# Patient Record
Sex: FEMALE | Race: WHITE | NOT HISPANIC OR LATINO | Employment: OTHER | URBAN - METROPOLITAN AREA
[De-identification: names, ages, dates, MRNs, and addresses within clinical notes are randomized per-mention and may not be internally consistent; named-entity substitution may affect disease eponyms.]

---

## 2023-07-22 ENCOUNTER — HOSPITAL ENCOUNTER (OUTPATIENT)
Dept: DATA CONVERSION | Facility: HOSPITAL | Age: 88
Discharge: STILL ADMITTED OR ADMINISTRATIVE | End: 2023-07-22
Attending: EMERGENCY MEDICINE

## 2023-07-22 DIAGNOSIS — R33.9 RETENTION OF URINE, UNSPECIFIED: ICD-10-CM

## 2023-07-22 DIAGNOSIS — S30.0XXA CONTUSION OF LOWER BACK AND PELVIS, INITIAL ENCOUNTER: ICD-10-CM

## 2023-07-22 DIAGNOSIS — U07.1 COVID-19: ICD-10-CM

## 2023-07-22 DIAGNOSIS — R53.1 WEAKNESS: ICD-10-CM

## 2023-07-22 DIAGNOSIS — E78.5 HYPERLIPIDEMIA, UNSPECIFIED: ICD-10-CM
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
## 2023-07-22 DIAGNOSIS — E87.1 HYPO-OSMOLALITY AND HYPONATREMIA: ICD-10-CM

## 2023-07-22 DIAGNOSIS — M48.061 SPINAL STENOSIS, LUMBAR REGION WITHOUT NEUROGENIC CLAUDICATION: ICD-10-CM
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
## 2023-07-22 DIAGNOSIS — I10 ESSENTIAL (PRIMARY) HYPERTENSION: ICD-10-CM

## 2023-07-22 DIAGNOSIS — Z88.0 ALLERGY STATUS TO PENICILLIN: ICD-10-CM

## 2023-07-22 LAB
ALBUMIN SERPL-MCNC: 4 GM/DL (ref 3.5–5)
ALBUMIN/GLOB SERPL: 1.3 RATIO (ref 1.5–3)
ALP BLD-CCNC: 121 U/L (ref 35–125)
ALT SERPL-CCNC: 9 U/L (ref 5–40)
ANION GAP SERPL CALCULATED.3IONS-SCNC: 13 MMOL/L (ref 0–19)
AST SERPL-CCNC: 19 U/L (ref 5–40)
BASOPHILS # BLD AUTO: 0.04 K/UL (ref 0–0.22)
BASOPHILS NFR BLD AUTO: 0.5 % (ref 0–1)
BILIRUB SERPL-MCNC: 0.5 MG/DL (ref 0.1–1.2)
BUN SERPL-MCNC: 14 MG/DL (ref 8–25)
BUN/CREAT SERPL: 15.6 RATIO (ref 8–21)
CALCIUM SERPL-MCNC: 9.1 MG/DL (ref 8.5–10.4)
CHLORIDE SERPL-SCNC: 97 MMOL/L (ref 97–107)
CO2 SERPL-SCNC: 21 MMOL/L (ref 24–31)
CREAT SERPL-MCNC: 0.9 MG/DL (ref 0.4–1.6)
DEPRECATED RDW RBC AUTO: 39.9 FL (ref 37–54)
DIFFERENTIAL METHOD BLD: ABNORMAL
EOSINOPHIL # BLD AUTO: 0.01 K/UL (ref 0–0.45)
EOSINOPHIL NFR BLD: 0.1 % (ref 0–3)
ERYTHROCYTE [DISTWIDTH] IN BLOOD BY AUTOMATED COUNT: 12.8 % (ref 11.7–15)
GFR SERPL CREATININE-BSD FRML MDRD: 60 ML/MIN/1.73 M2
GLOBULIN SER-MCNC: 3 G/DL (ref 1.9–3.7)
GLUCOSE SERPL-MCNC: 112 MG/DL (ref 65–99)
HCT VFR BLD AUTO: 40.6 % (ref 36–44)
HGB BLD-MCNC: 14 GM/DL (ref 12–15)
HS TROPONIN T DELTA: 1 (ref 0–4)
HS TROPONIN T DELTA: ABNORMAL (ref 0–4)
IMM GRANULOCYTES # BLD AUTO: 0.03 K/UL (ref 0–0.1)
LIPASE SERPL-CCNC: 22 U/L (ref 16–63)
LYMPHOCYTES # BLD AUTO: 0.53 K/UL (ref 1.2–3.2)
LYMPHOCYTES NFR BLD MANUAL: 7.2 % (ref 20–40)
MCH RBC QN AUTO: 29.9 PG (ref 26–34)
MCHC RBC AUTO-ENTMCNC: 34.5 % (ref 31–37)
MCV RBC AUTO: 86.6 FL (ref 80–100)
MONOCYTES # BLD AUTO: 1.13 K/UL (ref 0–0.8)
MONOCYTES NFR BLD MANUAL: 15.3 % (ref 0–8)
NEUTROPHILS # BLD AUTO: 5.63 K/UL
NEUTROPHILS # BLD AUTO: 5.63 K/UL (ref 1.8–7.7)
NEUTROPHILS.IMMATURE NFR BLD: 0.4 % (ref 0–1)
NEUTS SEG NFR BLD: 76.5 % (ref 50–70)
NRBC BLD-RTO: 0 /100 WBC
PLATELET # BLD AUTO: 239 K/UL (ref 150–450)
PMV BLD AUTO: 9.1 CU (ref 7–12.6)
POTASSIUM SERPL-SCNC: 3.7 MMOL/L (ref 3.4–5.1)
PROT SERPL-MCNC: 7 G/DL (ref 5.9–7.9)
RBC # BLD AUTO: 4.69 M/UL (ref 4–4.9)
SODIUM SERPL-SCNC: 131 MMOL/L (ref 133–145)
TROPONIN T SERPL-MCNC: 27 NG/L
TROPONIN T SERPL-MCNC: 28 NG/L
WBC # BLD AUTO: 7.4 K/UL (ref 4.5–11)

## 2023-07-23 LAB
BACTERIA UR QL AUTO: NEGATIVE
BILIRUB UR QL STRIP.AUTO: NEGATIVE
C TRACH RRNA SPEC QL NAA+PROBE: NORMAL
CLARITY UR: CLEAR
COLOR UR: ABNORMAL
DRUG SCREEN COMMENT UR-IMP: NORMAL
GLUCOSE BLD STRIP.AUTO-MCNC: 96 MG/DL (ref 65–99)
GLUCOSE UR STRIP.AUTO-MCNC: NEGATIVE MG/DL
HGB UR QL STRIP.AUTO: 2 /HPF (ref 0–3)
HGB UR QL: NEGATIVE
HYALINE CASTS UR QL AUTO: ABNORMAL /LPF
KETONES UR QL STRIP.AUTO: ABNORMAL
LEUKOCYTE ESTERASE UR QL STRIP.AUTO: NEGATIVE
MICROSCOPIC (UA): ABNORMAL
N GONORRHOEA RRNA SPEC QL NAA+PROBE: NORMAL
NITRITE UR QL STRIP.AUTO: NEGATIVE
NOTE (COV): ABNORMAL
PH UR STRIP.AUTO: 6.5 [PH] (ref 4.6–8)
PROT UR STRIP.AUTO-MCNC: NEGATIVE MG/DL
SARS-COV-2 RNA RESP QL NAA+PROBE: ABNORMAL
SP GR UR STRIP.AUTO: 1.02 (ref 1–1.03)
SPECIMEN SOURCE (COV): ABNORMAL
SPECIMEN SOURCE: NORMAL
SQUAMOUS UR QL AUTO: ABNORMAL /HPF
T VAGINALIS GENITAL QL WET PREP: NEGATIVE
URINE CULTURE: ABNORMAL
UROBILINOGEN UR QL STRIP.AUTO: NORMAL MG/DL (ref 0–1)
WBC #/AREA URNS AUTO: 1 /HPF (ref 0–3)
WBC VAG QL WET PREP: NEGATIVE
YEAST GENITAL QL WET PREP: NEGATIVE

## 2023-07-24 LAB
ANION GAP SERPL CALCULATED.3IONS-SCNC: 11 MMOL/L (ref 0–19)
BUN SERPL-MCNC: 11 MG/DL (ref 8–25)
BUN/CREAT SERPL: 13.8 RATIO (ref 8–21)
CALCIUM SERPL-MCNC: 8.7 MG/DL (ref 8.5–10.4)
CHLORIDE SERPL-SCNC: 97 MMOL/L (ref 97–107)
CK SERPL-CCNC: 103 U/L (ref 24–195)
CO2 SERPL-SCNC: 21 MMOL/L (ref 24–31)
CREAT SERPL-MCNC: 0.8 MG/DL (ref 0.4–1.6)
GFR SERPL CREATININE-BSD FRML MDRD: 69 ML/MIN/1.73 M2
GLUCOSE SERPL-MCNC: 108 MG/DL (ref 65–99)
POTASSIUM SERPL-SCNC: 3.9 MMOL/L (ref 3.4–5.1)
SODIUM SERPL-SCNC: 129 MMOL/L (ref 133–145)

## 2023-07-25 LAB
ANION GAP SERPL CALCULATED.3IONS-SCNC: 11 MMOL/L (ref 0–19)
BUN SERPL-MCNC: 12 MG/DL (ref 8–25)
BUN/CREAT SERPL: 15 RATIO (ref 8–21)
CALCIUM SERPL-MCNC: 8.7 MG/DL (ref 8.5–10.4)
CHLORIDE SERPL-SCNC: 100 MMOL/L (ref 97–107)
CO2 SERPL-SCNC: 22 MMOL/L (ref 24–31)
CREAT SERPL-MCNC: 0.8 MG/DL (ref 0.4–1.6)
GFR SERPL CREATININE-BSD FRML MDRD: 69 ML/MIN/1.73 M2
GLUCOSE SERPL-MCNC: 88 MG/DL (ref 65–99)
POTASSIUM SERPL-SCNC: 3.8 MMOL/L (ref 3.4–5.1)
SODIUM SERPL-SCNC: 133 MMOL/L (ref 133–145)

## 2023-07-26 LAB
ANION GAP SERPL CALCULATED.3IONS-SCNC: 10 MMOL/L (ref 0–19)
BUN SERPL-MCNC: 12 MG/DL (ref 8–25)
BUN/CREAT SERPL: 13.3 RATIO (ref 8–21)
CALCIUM SERPL-MCNC: 8.8 MG/DL (ref 8.5–10.4)
CHLORIDE SERPL-SCNC: 96 MMOL/L (ref 97–107)
CO2 SERPL-SCNC: 26 MMOL/L (ref 24–31)
CREAT SERPL-MCNC: 0.9 MG/DL (ref 0.4–1.6)
GFR SERPL CREATININE-BSD FRML MDRD: 60 ML/MIN/1.73 M2
GLUCOSE SERPL-MCNC: 89 MG/DL (ref 65–99)
POTASSIUM SERPL-SCNC: 3.9 MMOL/L (ref 3.4–5.1)
SODIUM SERPL-SCNC: 132 MMOL/L (ref 133–145)

## 2023-07-27 LAB
ANION GAP SERPL CALCULATED.3IONS-SCNC: 11 MMOL/L (ref 0–19)
BUN SERPL-MCNC: 11 MG/DL (ref 8–25)
BUN/CREAT SERPL: 12.2 RATIO (ref 8–21)
CALCIUM SERPL-MCNC: 8.9 MG/DL (ref 8.5–10.4)
CHLORIDE SERPL-SCNC: 98 MMOL/L (ref 97–107)
CO2 SERPL-SCNC: 24 MMOL/L (ref 24–31)
CREAT SERPL-MCNC: 0.9 MG/DL (ref 0.4–1.6)
DEPRECATED RDW RBC AUTO: 41.4 FL (ref 37–54)
ERYTHROCYTE [DISTWIDTH] IN BLOOD BY AUTOMATED COUNT: 12.9 % (ref 11.7–15)
GFR SERPL CREATININE-BSD FRML MDRD: 60 ML/MIN/1.73 M2
GLUCOSE SERPL-MCNC: 89 MG/DL (ref 65–99)
HCT VFR BLD AUTO: 39.5 % (ref 36–44)
HGB BLD-MCNC: 13.6 GM/DL (ref 12–15)
MCH RBC QN AUTO: 30.4 PG (ref 26–34)
MCHC RBC AUTO-ENTMCNC: 34.4 % (ref 31–37)
MCV RBC AUTO: 88.4 FL (ref 80–100)
NRBC BLD-RTO: 0 /100 WBC
PLATELET # BLD AUTO: 230 K/UL (ref 150–450)
PMV BLD AUTO: 9.6 CU (ref 7–12.6)
POTASSIUM SERPL-SCNC: 3.9 MMOL/L (ref 3.4–5.1)
RBC # BLD AUTO: 4.47 M/UL (ref 4–4.9)
SODIUM SERPL-SCNC: 133 MMOL/L (ref 133–145)
WBC # BLD AUTO: 5 K/UL (ref 4.5–11)

## 2023-07-28 LAB
ANION GAP SERPL CALCULATED.3IONS-SCNC: 11 MMOL/L (ref 0–19)
BUN SERPL-MCNC: 10 MG/DL (ref 8–25)
BUN/CREAT SERPL: 11.1 RATIO (ref 8–21)
CALCIUM SERPL-MCNC: 9.1 MG/DL (ref 8.5–10.4)
CHLORIDE SERPL-SCNC: 94 MMOL/L (ref 97–107)
CO2 SERPL-SCNC: 27 MMOL/L (ref 24–31)
CREAT SERPL-MCNC: 0.9 MG/DL (ref 0.4–1.6)
GFR SERPL CREATININE-BSD FRML MDRD: 60 ML/MIN/1.73 M2
GLUCOSE SERPL-MCNC: 91 MG/DL (ref 65–99)
POTASSIUM SERPL-SCNC: 4.3 MMOL/L (ref 3.4–5.1)
SODIUM SERPL-SCNC: 132 MMOL/L (ref 133–145)

## 2023-07-29 LAB
ALANINE AMINOTRANSFERASE (SGPT) (U/L) IN SER/PLAS: 7 U/L (ref 7–45)
ALBUMIN (G/DL) IN SER/PLAS: 3.4 G/DL (ref 3.4–5)
ALKALINE PHOSPHATASE (U/L) IN SER/PLAS: 87 U/L (ref 33–136)
ANION GAP IN SER/PLAS: 14 MMOL/L (ref 10–20)
ASPARTATE AMINOTRANSFERASE (SGOT) (U/L) IN SER/PLAS: 16 U/L (ref 9–39)
BILIRUBIN TOTAL (MG/DL) IN SER/PLAS: 0.7 MG/DL (ref 0–1.2)
CALCIUM (MG/DL) IN SER/PLAS: 8.8 MG/DL (ref 8.6–10.3)
CARBON DIOXIDE, TOTAL (MMOL/L) IN SER/PLAS: 26 MMOL/L (ref 21–32)
CHLORIDE (MMOL/L) IN SER/PLAS: 93 MMOL/L (ref 98–107)
CREATININE (MG/DL) IN SER/PLAS: 0.88 MG/DL (ref 0.5–1.05)
ERYTHROCYTE DISTRIBUTION WIDTH (RATIO) BY AUTOMATED COUNT: 12.7 % (ref 11.5–14.5)
ERYTHROCYTE MEAN CORPUSCULAR HEMOGLOBIN CONCENTRATION (G/DL) BY AUTOMATED: 36.6 G/DL (ref 32–36)
ERYTHROCYTE MEAN CORPUSCULAR VOLUME (FL) BY AUTOMATED COUNT: 90 FL (ref 80–100)
ERYTHROCYTES (10*6/UL) IN BLOOD BY AUTOMATED COUNT: 4.01 X10E12/L (ref 4–5.2)
GFR FEMALE: 61 ML/MIN/1.73M2
GLUCOSE (MG/DL) IN SER/PLAS: 79 MG/DL (ref 74–99)
HEMATOCRIT (%) IN BLOOD BY AUTOMATED COUNT: 36.1 % (ref 36–46)
HEMOGLOBIN (G/DL) IN BLOOD: 13.2 G/DL (ref 12–16)
LEUKOCYTES (10*3/UL) IN BLOOD BY AUTOMATED COUNT: 6 X10E9/L (ref 4.4–11.3)
PLATELETS (10*3/UL) IN BLOOD AUTOMATED COUNT: 294 X10E9/L (ref 150–450)
POTASSIUM (MMOL/L) IN SER/PLAS: 4 MMOL/L (ref 3.5–5.3)
PROTEIN TOTAL: 5.9 G/DL (ref 6.4–8.2)
SODIUM (MMOL/L) IN SER/PLAS: 129 MMOL/L (ref 136–145)
UREA NITROGEN (MG/DL) IN SER/PLAS: 11 MG/DL (ref 6–23)

## 2023-08-01 LAB
ANION GAP IN SER/PLAS: 15 MMOL/L (ref 10–20)
CALCIUM (MG/DL) IN SER/PLAS: 8.9 MG/DL (ref 8.6–10.3)
CARBON DIOXIDE, TOTAL (MMOL/L) IN SER/PLAS: 25 MMOL/L (ref 21–32)
CHLORIDE (MMOL/L) IN SER/PLAS: 90 MMOL/L (ref 98–107)
CREATININE (MG/DL) IN SER/PLAS: 0.96 MG/DL (ref 0.5–1.05)
ERYTHROCYTE DISTRIBUTION WIDTH (RATIO) BY AUTOMATED COUNT: 12.3 % (ref 11.5–14.5)
ERYTHROCYTE MEAN CORPUSCULAR HEMOGLOBIN CONCENTRATION (G/DL) BY AUTOMATED: 36.7 G/DL (ref 32–36)
ERYTHROCYTE MEAN CORPUSCULAR VOLUME (FL) BY AUTOMATED COUNT: 91 FL (ref 80–100)
ERYTHROCYTES (10*6/UL) IN BLOOD BY AUTOMATED COUNT: 3.79 X10E12/L (ref 4–5.2)
GFR FEMALE: 55 ML/MIN/1.73M2
GLUCOSE (MG/DL) IN SER/PLAS: 70 MG/DL (ref 74–99)
HEMATOCRIT (%) IN BLOOD BY AUTOMATED COUNT: 34.3 % (ref 36–46)
HEMOGLOBIN (G/DL) IN BLOOD: 12.6 G/DL (ref 12–16)
LEUKOCYTES (10*3/UL) IN BLOOD BY AUTOMATED COUNT: 7.3 X10E9/L (ref 4.4–11.3)
PLATELETS (10*3/UL) IN BLOOD AUTOMATED COUNT: 347 X10E9/L (ref 150–450)
POTASSIUM (MMOL/L) IN SER/PLAS: 4.1 MMOL/L (ref 3.5–5.3)
SODIUM (MMOL/L) IN SER/PLAS: 126 MMOL/L (ref 136–145)
UREA NITROGEN (MG/DL) IN SER/PLAS: 24 MG/DL (ref 6–23)

## 2023-08-03 LAB
ANION GAP IN SER/PLAS: 13 MMOL/L (ref 10–20)
CALCIUM (MG/DL) IN SER/PLAS: 8.9 MG/DL (ref 8.6–10.3)
CARBON DIOXIDE, TOTAL (MMOL/L) IN SER/PLAS: 24 MMOL/L (ref 21–32)
CHLORIDE (MMOL/L) IN SER/PLAS: 97 MMOL/L (ref 98–107)
CREATININE (MG/DL) IN SER/PLAS: 0.69 MG/DL (ref 0.5–1.05)
GFR FEMALE: 81 ML/MIN/1.73M2
GLUCOSE (MG/DL) IN SER/PLAS: 82 MG/DL (ref 74–99)
POTASSIUM (MMOL/L) IN SER/PLAS: 4.8 MMOL/L (ref 3.5–5.3)
SODIUM (MMOL/L) IN SER/PLAS: 129 MMOL/L (ref 136–145)
UREA NITROGEN (MG/DL) IN SER/PLAS: 23 MG/DL (ref 6–23)

## 2023-08-08 LAB
ANION GAP IN SER/PLAS: 15 MMOL/L (ref 10–20)
CALCIUM (MG/DL) IN SER/PLAS: 8.7 MG/DL (ref 8.6–10.3)
CARBON DIOXIDE, TOTAL (MMOL/L) IN SER/PLAS: 23 MMOL/L (ref 21–32)
CHLORIDE (MMOL/L) IN SER/PLAS: 101 MMOL/L (ref 98–107)
CREATININE (MG/DL) IN SER/PLAS: 0.89 MG/DL (ref 0.5–1.05)
ERYTHROCYTE DISTRIBUTION WIDTH (RATIO) BY AUTOMATED COUNT: 13.1 % (ref 11.5–14.5)
ERYTHROCYTE MEAN CORPUSCULAR HEMOGLOBIN CONCENTRATION (G/DL) BY AUTOMATED: 35.9 G/DL (ref 32–36)
ERYTHROCYTE MEAN CORPUSCULAR VOLUME (FL) BY AUTOMATED COUNT: 92 FL (ref 80–100)
ERYTHROCYTES (10*6/UL) IN BLOOD BY AUTOMATED COUNT: 3.88 X10E12/L (ref 4–5.2)
GFR FEMALE: 60 ML/MIN/1.73M2
GLUCOSE (MG/DL) IN SER/PLAS: 83 MG/DL (ref 74–99)
HEMATOCRIT (%) IN BLOOD BY AUTOMATED COUNT: 35.7 % (ref 36–46)
HEMOGLOBIN (G/DL) IN BLOOD: 12.8 G/DL (ref 12–16)
LEUKOCYTES (10*3/UL) IN BLOOD BY AUTOMATED COUNT: 8.1 X10E9/L (ref 4.4–11.3)
PLATELETS (10*3/UL) IN BLOOD AUTOMATED COUNT: 340 X10E9/L (ref 150–450)
POTASSIUM (MMOL/L) IN SER/PLAS: 4.7 MMOL/L (ref 3.5–5.3)
SODIUM (MMOL/L) IN SER/PLAS: 134 MMOL/L (ref 136–145)
UREA NITROGEN (MG/DL) IN SER/PLAS: 31 MG/DL (ref 6–23)

## 2023-10-02 ENCOUNTER — TELEPHONE (OUTPATIENT)
Dept: PRIMARY CARE | Facility: CLINIC | Age: 88
End: 2023-10-02
Payer: MEDICARE

## 2023-10-02 DIAGNOSIS — Z23 ENCOUNTER FOR IMMUNIZATION: Primary | ICD-10-CM

## 2023-10-02 NOTE — TELEPHONE ENCOUNTER
Pt daughter/ POA Makenna Arguello called stating that mother is receiving Home care states that the nurse from the agency called requesting Flu shot orders for her , states that it hasn't been placed daughter wants them placed and [facility] [phone] their phone number Please advise

## 2023-10-04 ENCOUNTER — TELEPHONE (OUTPATIENT)
Dept: PRIMARY CARE | Facility: CLINIC | Age: 88
End: 2023-10-04

## 2023-10-04 PROBLEM — K59.01 CONSTIPATION BY DELAYED COLONIC TRANSIT: Status: ACTIVE | Noted: 2023-10-04

## 2023-10-04 PROBLEM — R53.1 ASTHENIA: Status: ACTIVE | Noted: 2023-10-04

## 2023-10-04 PROBLEM — E78.5 HYPERLIPIDEMIA: Status: ACTIVE | Noted: 2023-10-04

## 2023-10-04 PROBLEM — N81.11 MIDLINE CYSTOCELE: Status: ACTIVE | Noted: 2023-10-04

## 2023-10-04 PROBLEM — N81.9 PROLAPSE OF FEMALE GENITAL ORGANS: Status: ACTIVE | Noted: 2023-10-04

## 2023-10-04 PROBLEM — E55.9 VITAMIN D DEFICIENCY: Status: ACTIVE | Noted: 2023-10-04

## 2023-10-04 PROBLEM — E87.1 HYPONATREMIA: Status: ACTIVE | Noted: 2023-10-04

## 2023-10-04 RX ORDER — ATORVASTATIN CALCIUM 10 MG/1
10 TABLET, FILM COATED ORAL NIGHTLY
COMMUNITY
Start: 2023-08-07 | End: 2023-11-29 | Stop reason: WASHOUT

## 2023-10-04 RX ORDER — PRAVASTATIN SODIUM 20 MG/1
20 TABLET ORAL DAILY
COMMUNITY
End: 2023-11-08

## 2023-10-04 RX ORDER — LISINOPRIL 10 MG/1
10 TABLET ORAL DAILY
COMMUNITY
End: 2023-11-29 | Stop reason: WASHOUT

## 2023-10-04 RX ORDER — ESTRADIOL 10 UG/1
10 INSERT VAGINAL 2 TIMES WEEKLY
COMMUNITY
End: 2023-11-29 | Stop reason: WASHOUT

## 2023-10-04 RX ORDER — AMLODIPINE BESYLATE 2.5 MG/1
2.5 TABLET ORAL DAILY
COMMUNITY
End: 2023-11-29 | Stop reason: WASHOUT

## 2023-10-04 RX ORDER — NAPROXEN SODIUM 220 MG/1
81 TABLET, FILM COATED ORAL DAILY
COMMUNITY
End: 2023-11-29 | Stop reason: WASHOUT

## 2023-10-04 RX ORDER — CYANOCOBALAMIN (VITAMIN B-12) 500 MCG
400 TABLET ORAL DAILY
COMMUNITY

## 2023-10-04 NOTE — TELEPHONE ENCOUNTER
Pt scheduled to come in to have flu vaccine administered.  QUENTIN SHELBY on 10/4/23 at 4:39 PM.

## 2023-10-05 ENCOUNTER — CLINICAL SUPPORT (OUTPATIENT)
Dept: PRIMARY CARE | Facility: CLINIC | Age: 88
End: 2023-10-05
Payer: MEDICARE

## 2023-10-05 DIAGNOSIS — Z23 NEED FOR PROPHYLACTIC VACCINATION AND INOCULATION AGAINST CHOLERA ALONE: ICD-10-CM

## 2023-10-05 DIAGNOSIS — Z23 ENCOUNTER FOR IMMUNIZATION: ICD-10-CM

## 2023-10-05 PROCEDURE — 90662 IIV NO PRSV INCREASED AG IM: CPT | Performed by: NURSE PRACTITIONER

## 2023-11-07 DIAGNOSIS — E78.5 HYPERLIPIDEMIA, UNSPECIFIED HYPERLIPIDEMIA TYPE: ICD-10-CM

## 2023-11-07 NOTE — TELEPHONE ENCOUNTER
Rx Request received. Medication and pharmacy populated.    Last appointment -6 month follow up on 5/24/23, LM for pt to call back to schedule a physical-due after 11/28/23.      QUENTIN SHELBY on 11/7/23 at 2:45 PM.

## 2023-11-08 RX ORDER — PRAVASTATIN SODIUM 20 MG/1
20 TABLET ORAL DAILY
Qty: 90 TABLET | Refills: 0 | Status: SHIPPED | OUTPATIENT
Start: 2023-11-08 | End: 2023-11-29 | Stop reason: SDUPTHER

## 2023-11-29 ENCOUNTER — OFFICE VISIT (OUTPATIENT)
Dept: PRIMARY CARE | Facility: CLINIC | Age: 88
End: 2023-11-29
Payer: MEDICARE

## 2023-11-29 VITALS
WEIGHT: 117 LBS | OXYGEN SATURATION: 94 % | HEART RATE: 69 BPM | SYSTOLIC BLOOD PRESSURE: 120 MMHG | RESPIRATION RATE: 18 BRPM | TEMPERATURE: 97.8 F | HEIGHT: 59 IN | BODY MASS INDEX: 23.59 KG/M2 | DIASTOLIC BLOOD PRESSURE: 68 MMHG

## 2023-11-29 DIAGNOSIS — Z00.00 ANNUAL PHYSICAL EXAM: ICD-10-CM

## 2023-11-29 DIAGNOSIS — Z13.6 SCREENING FOR CARDIOVASCULAR CONDITION: ICD-10-CM

## 2023-11-29 DIAGNOSIS — Z00.00 ROUTINE GENERAL MEDICAL EXAMINATION AT HEALTH CARE FACILITY: Primary | ICD-10-CM

## 2023-11-29 DIAGNOSIS — E78.5 HYPERLIPIDEMIA, UNSPECIFIED HYPERLIPIDEMIA TYPE: ICD-10-CM

## 2023-11-29 DIAGNOSIS — Z13.1 DIABETES MELLITUS SCREENING: ICD-10-CM

## 2023-11-29 PROCEDURE — 1170F FXNL STATUS ASSESSED: CPT | Performed by: NURSE PRACTITIONER

## 2023-11-29 PROCEDURE — 1159F MED LIST DOCD IN RCRD: CPT | Performed by: NURSE PRACTITIONER

## 2023-11-29 PROCEDURE — G0439 PPPS, SUBSEQ VISIT: HCPCS | Performed by: NURSE PRACTITIONER

## 2023-11-29 PROCEDURE — 1036F TOBACCO NON-USER: CPT | Performed by: NURSE PRACTITIONER

## 2023-11-29 PROCEDURE — 1126F AMNT PAIN NOTED NONE PRSNT: CPT | Performed by: NURSE PRACTITIONER

## 2023-11-29 RX ORDER — PRAVASTATIN SODIUM 20 MG/1
20 TABLET ORAL DAILY
Qty: 90 TABLET | Refills: 1 | Status: SHIPPED | OUTPATIENT
Start: 2023-11-29 | End: 2024-05-27

## 2023-11-29 ASSESSMENT — PATIENT HEALTH QUESTIONNAIRE - PHQ9
2. FEELING DOWN, DEPRESSED OR HOPELESS: NOT AT ALL
1. LITTLE INTEREST OR PLEASURE IN DOING THINGS: NOT AT ALL
1. LITTLE INTEREST OR PLEASURE IN DOING THINGS: NOT AT ALL
2. FEELING DOWN, DEPRESSED OR HOPELESS: NOT AT ALL
SUM OF ALL RESPONSES TO PHQ9 QUESTIONS 1 AND 2: 0
SUM OF ALL RESPONSES TO PHQ9 QUESTIONS 1 AND 2: 0

## 2023-11-29 ASSESSMENT — ACTIVITIES OF DAILY LIVING (ADL)
GROCERY_SHOPPING: NEEDS ASSISTANCE
DRESSING: INDEPENDENT
DOING_HOUSEWORK: NEEDS ASSISTANCE
TAKING_MEDICATION: INDEPENDENT
BATHING: INDEPENDENT
MANAGING_FINANCES: INDEPENDENT

## 2023-11-29 ASSESSMENT — ENCOUNTER SYMPTOMS
PSYCHIATRIC NEGATIVE: 1
ALLERGIC/IMMUNOLOGIC NEGATIVE: 1
RESPIRATORY NEGATIVE: 1
CONSTITUTIONAL NEGATIVE: 1
EYES NEGATIVE: 1
CARDIOVASCULAR NEGATIVE: 1
MUSCULOSKELETAL NEGATIVE: 1
DEPRESSION: 0
OCCASIONAL FEELINGS OF UNSTEADINESS: 1
GASTROINTESTINAL NEGATIVE: 1
LOSS OF SENSATION IN FEET: 0
HEMATOLOGIC/LYMPHATIC NEGATIVE: 1
ENDOCRINE NEGATIVE: 1
NEUROLOGICAL NEGATIVE: 1

## 2023-11-29 ASSESSMENT — PAIN SCALES - GENERAL: PAINLEVEL: 0-NO PAIN

## 2023-11-29 NOTE — PROGRESS NOTES
"History Of Present Illness  Sofía Bass is a 89 y.o. female presenting for \"Annual Exam (Already had flu and covid shots).\"    HPI 89-year-old female here today for her annual exam she has already had her flu and COVID-vaccine no questions or concerns today no falls no safety concerns with her daughter    Past Medical History  Patient Active Problem List    Diagnosis Date Noted   • Asthenia 10/04/2023   • Constipation by delayed colonic transit 10/04/2023   • Hyperlipidemia 10/04/2023   • Hyponatremia 10/04/2023   • Midline cystocele 10/04/2023   • Prolapse of female genital organs 10/04/2023   • Vitamin D deficiency 10/04/2023        Medications  Current Outpatient Medications   Medication Instructions   • cholecalciferol (VITAMIN D-3) 400 Units, oral, Daily   • pravastatin (PRAVACHOL) 20 mg, oral, Daily        Surgical History  She has no past surgical history on file.     Social History  She reports that she has never smoked. She has never been exposed to tobacco smoke. She has never used smokeless tobacco. She reports that she does not drink alcohol and does not use drugs.    Family History  Family History   Problem Relation Name Age of Onset   • Cancer Mother     • Kidney failure Mother     • Accidental death Father     • Breast cancer Sister     • Cancer Sister     • Colon cancer Brother     • Cancer Brother     • Cancer Other Grandson    • Stomach cancer Other Grandson         Allergies  Alendronate, Penicillin, Rosuvastatin, and Sulfa (sulfonamide antibiotics)    ROS  Review of Systems   Constitutional: Negative.    HENT: Negative.     Eyes: Negative.    Respiratory: Negative.     Cardiovascular: Negative.    Gastrointestinal: Negative.    Endocrine: Negative.    Genitourinary: Negative.    Musculoskeletal: Negative.    Skin: Negative.    Allergic/Immunologic: Negative.    Neurological: Negative.    Hematological: Negative.    Psychiatric/Behavioral: Negative.          Last Recorded Vitals  /68 (BP " Location: Left arm, Patient Position: Sitting, BP Cuff Size: Adult)   Pulse 69   Temp 36.6 °C (97.8 °F) (Temporal)   Resp 18   Wt 53.1 kg (117 lb)   SpO2 94%     Physical Exam  Vitals and nursing note reviewed.   Constitutional:       Appearance: Normal appearance.   HENT:      Head: Normocephalic and atraumatic.      Right Ear: Tympanic membrane, ear canal and external ear normal.      Left Ear: Tympanic membrane, ear canal and external ear normal.      Nose: Nose normal.      Mouth/Throat:      Mouth: Mucous membranes are moist.      Pharynx: Oropharynx is clear.   Eyes:      Extraocular Movements: Extraocular movements intact.      Conjunctiva/sclera: Conjunctivae normal.      Pupils: Pupils are equal, round, and reactive to light.   Neck:      Thyroid: No thyromegaly.   Cardiovascular:      Rate and Rhythm: Normal rate and regular rhythm.      Pulses: Normal pulses.      Heart sounds: Normal heart sounds, S1 normal and S2 normal.   Pulmonary:      Effort: Pulmonary effort is normal.      Breath sounds: Normal breath sounds. No wheezing or rhonchi.   Abdominal:      General: Bowel sounds are normal.      Palpations: Abdomen is soft. There is no mass.      Tenderness: There is no abdominal tenderness. There is no guarding.   Genitourinary:     Comments: Not examined  Musculoskeletal:         General: Normal range of motion.      Cervical back: Normal range of motion.      Right lower leg: No edema.      Left lower leg: No edema.   Lymphadenopathy:      Cervical: No cervical adenopathy.   Skin:     General: Skin is warm and dry.      Capillary Refill: Capillary refill takes less than 2 seconds.      Findings: No rash.   Neurological:      General: No focal deficit present.      Mental Status: She is alert and oriented to person, place, and time. Mental status is at baseline.      Cranial Nerves: Cranial nerves 2-12 are intact. No cranial nerve deficit.      Sensory: Sensation is intact.      Motor: Motor  function is intact.      Coordination: Coordination is intact.      Gait: Gait is intact.      Comments: CII - XII grossly intact   Psychiatric:         Mood and Affect: Mood normal.         Behavior: Behavior normal.         Thought Content: Thought content normal.         Judgment: Judgment normal.       Relevant Results      Assessment/Plan   Sofía was seen today for annual exam.  Diagnoses and all orders for this visit:  Routine general medical examination at health care facility (Primary)  -     1 Year Follow Up In Primary Care - Wellness Exam; Future  Diabetes mellitus screening  Screening for cardiovascular condition  Hyperlipidemia, unspecified hyperlipidemia type  -     pravastatin (Pravachol) 20 mg tablet; Take 1 tablet (20 mg) by mouth once daily.  -     Comprehensive Metabolic Panel; Future  -     Lipid Panel; Future  Annual physical exam          Allison Morrison, APRN-CNP

## 2024-08-09 DIAGNOSIS — E78.5 HYPERLIPIDEMIA, UNSPECIFIED HYPERLIPIDEMIA TYPE: ICD-10-CM

## 2024-08-09 NOTE — TELEPHONE ENCOUNTER
PHARMACY REQUESTING REFILL, SPOKE WITH PT SON IN LAW, HE WILL HAVE PT DAUGHTER CALL AND SCHEDULE 6 MONTH FOLLOW UP.

## 2024-08-12 DIAGNOSIS — E78.5 HYPERLIPIDEMIA, UNSPECIFIED HYPERLIPIDEMIA TYPE: ICD-10-CM

## 2024-08-12 RX ORDER — PRAVASTATIN SODIUM 20 MG/1
20 TABLET ORAL DAILY
Qty: 30 TABLET | Refills: 0 | Status: SHIPPED | OUTPATIENT
Start: 2024-08-12 | End: 2024-08-12

## 2024-08-12 RX ORDER — PRAVASTATIN SODIUM 20 MG/1
20 TABLET ORAL DAILY
Qty: 90 TABLET | Refills: 0 | Status: SHIPPED | OUTPATIENT
Start: 2024-08-12

## 2024-08-12 NOTE — TELEPHONE ENCOUNTER
Prescription request received and populated   Pharmacy populated  Last Office Visit: 11/29/23 for physical. LM to schedule for 6 mo follow up

## 2024-08-30 ENCOUNTER — TELEPHONE (OUTPATIENT)
Dept: PRIMARY CARE | Facility: CLINIC | Age: 89
End: 2024-08-30
Payer: MEDICARE

## 2024-08-30 NOTE — TELEPHONE ENCOUNTER
Pt's Daughter Makenna called asking if on pt's up coming appt if she can be administered the Flu shot.     Daughter Makenna requesting a call back at [phone]

## 2024-09-04 ENCOUNTER — OFFICE VISIT (OUTPATIENT)
Dept: PRIMARY CARE | Facility: CLINIC | Age: 89
End: 2024-09-04
Payer: MEDICARE

## 2024-09-04 ENCOUNTER — LAB (OUTPATIENT)
Dept: LAB | Facility: LAB | Age: 89
End: 2024-09-04
Payer: MEDICARE

## 2024-09-04 VITALS
HEART RATE: 71 BPM | SYSTOLIC BLOOD PRESSURE: 156 MMHG | TEMPERATURE: 97.1 F | RESPIRATION RATE: 18 BRPM | OXYGEN SATURATION: 95 % | DIASTOLIC BLOOD PRESSURE: 88 MMHG

## 2024-09-04 DIAGNOSIS — E78.2 MIXED HYPERLIPIDEMIA: ICD-10-CM

## 2024-09-04 DIAGNOSIS — R35.1 NOCTURIA: ICD-10-CM

## 2024-09-04 DIAGNOSIS — Z23 ENCOUNTER FOR IMMUNIZATION: ICD-10-CM

## 2024-09-04 DIAGNOSIS — E87.1 HYPONATREMIA: ICD-10-CM

## 2024-09-04 DIAGNOSIS — R29.898 WEAKNESS OF BOTH LOWER EXTREMITIES: Primary | ICD-10-CM

## 2024-09-04 LAB
ALBUMIN SERPL-MCNC: 4.4 G/DL (ref 3.5–5)
ALP BLD-CCNC: 123 U/L (ref 35–125)
ALT SERPL-CCNC: 8 U/L (ref 5–40)
ANION GAP SERPL CALC-SCNC: 12 MMOL/L
AST SERPL-CCNC: 16 U/L (ref 5–40)
BILIRUB SERPL-MCNC: 0.3 MG/DL (ref 0.1–1.2)
BUN SERPL-MCNC: 12 MG/DL (ref 8–25)
CALCIUM SERPL-MCNC: 10.3 MG/DL (ref 8.5–10.4)
CHLORIDE SERPL-SCNC: 94 MMOL/L (ref 97–107)
CHOLEST SERPL-MCNC: 192 MG/DL (ref 133–200)
CHOLEST/HDLC SERPL: 2.5 {RATIO}
CO2 SERPL-SCNC: 24 MMOL/L (ref 24–31)
CREAT SERPL-MCNC: 0.8 MG/DL (ref 0.4–1.6)
EGFRCR SERPLBLD CKD-EPI 2021: 68 ML/MIN/1.73M*2
GLUCOSE SERPL-MCNC: 105 MG/DL (ref 65–99)
HDLC SERPL-MCNC: 76 MG/DL
LDLC SERPL CALC-MCNC: 98 MG/DL (ref 65–130)
POTASSIUM SERPL-SCNC: 4.6 MMOL/L (ref 3.4–5.1)
PROT SERPL-MCNC: 7.1 G/DL (ref 5.9–7.9)
SODIUM SERPL-SCNC: 130 MMOL/L (ref 133–145)
TRIGL SERPL-MCNC: 88 MG/DL (ref 40–150)

## 2024-09-04 PROCEDURE — 90662 IIV NO PRSV INCREASED AG IM: CPT | Performed by: NURSE PRACTITIONER

## 2024-09-04 PROCEDURE — 1158F ADVNC CARE PLAN TLK DOCD: CPT | Performed by: NURSE PRACTITIONER

## 2024-09-04 PROCEDURE — 1036F TOBACCO NON-USER: CPT | Performed by: NURSE PRACTITIONER

## 2024-09-04 PROCEDURE — 99214 OFFICE O/P EST MOD 30 MIN: CPT | Mod: 25 | Performed by: NURSE PRACTITIONER

## 2024-09-04 PROCEDURE — 80061 LIPID PANEL: CPT

## 2024-09-04 PROCEDURE — 1123F ACP DISCUSS/DSCN MKR DOCD: CPT | Performed by: NURSE PRACTITIONER

## 2024-09-04 PROCEDURE — 99214 OFFICE O/P EST MOD 30 MIN: CPT | Performed by: NURSE PRACTITIONER

## 2024-09-04 PROCEDURE — 1126F AMNT PAIN NOTED NONE PRSNT: CPT | Performed by: NURSE PRACTITIONER

## 2024-09-04 PROCEDURE — 1159F MED LIST DOCD IN RCRD: CPT | Performed by: NURSE PRACTITIONER

## 2024-09-04 PROCEDURE — 36415 COLL VENOUS BLD VENIPUNCTURE: CPT

## 2024-09-04 PROCEDURE — 80053 COMPREHEN METABOLIC PANEL: CPT

## 2024-09-04 PROCEDURE — 1157F ADVNC CARE PLAN IN RCRD: CPT | Performed by: NURSE PRACTITIONER

## 2024-09-04 RX ORDER — PRAVASTATIN SODIUM 20 MG/1
20 TABLET ORAL DAILY
Qty: 90 TABLET | Refills: 1 | Status: SHIPPED | OUTPATIENT
Start: 2024-09-04 | End: 2025-03-03

## 2024-09-04 RX ORDER — TROSPIUM CHLORIDE 20 MG/1
10 TABLET, FILM COATED ORAL DAILY
Qty: 15 TABLET | Refills: 0 | Status: SHIPPED | OUTPATIENT
Start: 2024-09-04 | End: 2024-10-04

## 2024-09-04 ASSESSMENT — ENCOUNTER SYMPTOMS
CONSTITUTIONAL NEGATIVE: 1
GASTROINTESTINAL NEGATIVE: 1
OCCASIONAL FEELINGS OF UNSTEADINESS: 1
LOSS OF SENSATION IN FEET: 0
RESPIRATORY NEGATIVE: 1
CARDIOVASCULAR NEGATIVE: 1
MUSCULOSKELETAL NEGATIVE: 1
DEPRESSION: 0

## 2024-09-04 ASSESSMENT — PAIN SCALES - GENERAL: PAINLEVEL: 0-NO PAIN

## 2024-09-04 ASSESSMENT — PATIENT HEALTH QUESTIONNAIRE - PHQ9
2. FEELING DOWN, DEPRESSED OR HOPELESS: NOT AT ALL
1. LITTLE INTEREST OR PLEASURE IN DOING THINGS: NOT AT ALL
SUM OF ALL RESPONSES TO PHQ9 QUESTIONS 1 AND 2: 0

## 2024-09-04 NOTE — PROGRESS NOTES
"Chief Complaint  Sofía Bass is a 89 y.o. female presenting for \"Follow-up (6 MONTH FOLLOW UP).\"    HPI     Sofía Bass is a 89 y.o. female presenting for A SIX MONTH FOLLOW UP SHE HAS SOME ISSUES WITH NUMBNESS AND WEAKNESS IN HER LEFT LET, SHE HAD BEEN DOING SOME PT BUT NEEDS A NEW ORDER. WALKS AT HOME WITH A WALKER.        Past Medical History  Patient Active Problem List    Diagnosis Date Noted    Asthenia 10/04/2023    Constipation by delayed colonic transit 10/04/2023    Hyperlipidemia 10/04/2023    Hyponatremia 10/04/2023    Midline cystocele 10/04/2023    Prolapse of female genital organs 10/04/2023    Vitamin D deficiency 10/04/2023        Medications  Current Outpatient Medications   Medication Instructions    cholecalciferol (VITAMIN D-3) 400 Units, oral, Daily    pravastatin (PRAVACHOL) 20 mg, oral, Daily    trospium (SANCTURA) 10 mg, oral, Daily, AT DINNER TIME        Surgical History  She has no past surgical history on file.     Social History  She reports that she has never smoked. She has never been exposed to tobacco smoke. She has never used smokeless tobacco. She reports that she does not drink alcohol and does not use drugs.    Family History  Family History   Problem Relation Name Age of Onset    Cancer Mother      Kidney failure Mother      Accidental death Father      Breast cancer Sister      Cancer Sister      Colon cancer Brother      Cancer Brother      Cancer Other Grandson     Stomach cancer Other Grandson         Allergies  Alendronate, Penicillin, Rosuvastatin, and Sulfa (sulfonamide antibiotics)    ROS  Review of Systems   Constitutional: Negative.    Respiratory: Negative.     Cardiovascular: Negative.    Gastrointestinal: Negative.    Genitourinary: Negative.    Musculoskeletal: Negative.         Last Recorded Vitals  /88 (BP Location: Right arm, Patient Position: Sitting, BP Cuff Size: Adult)   Pulse 71   Temp 36.2 °C (97.1 °F)   Resp 18   SpO2 95%     Physical " Exam  Vitals and nursing note reviewed.   Constitutional:       Appearance: Normal appearance.   Eyes:      Pupils: Pupils are equal, round, and reactive to light.   Cardiovascular:      Rate and Rhythm: Normal rate and regular rhythm.      Pulses: Normal pulses.      Heart sounds: Normal heart sounds.   Pulmonary:      Effort: Pulmonary effort is normal.      Breath sounds: Normal breath sounds.   Musculoskeletal:      Comments: BLL WEAKNESS    Neurological:      Mental Status: She is alert.         Relevant Results      Assessment/Plan   Sofía was seen today for follow-up.  Diagnoses and all orders for this visit:  Weakness of both lower extremities (Primary)  -     Referral to Home Care; Future  Mixed hyperlipidemia  -     pravastatin (Pravachol) 20 mg tablet; Take 1 tablet (20 mg) by mouth once daily.  -     Lipid Panel; Future  Hyponatremia  -     Comprehensive Metabolic Panel; Future  Nocturia  -     trospium (Sanctura) 20 mg tablet; Take 0.5 tablets (10 mg) by mouth once daily. AT DINNER TIME          COUNSELING      Medication education:              Education:  The patient is counseled regarding potential side-effects of any and all new medications             Understanding:  Patient expressed understanding             Adherence:  No barriers to adherence identified        TOMI Suarez-CNP       Patient was identified as a fall risk. Risk prevention instructions provided.

## 2024-09-05 ENCOUNTER — HOME HEALTH ADMISSION (OUTPATIENT)
Dept: HOME HEALTH SERVICES | Facility: HOME HEALTH | Age: 89
End: 2024-09-05
Payer: MEDICARE

## 2024-09-05 ENCOUNTER — DOCUMENTATION (OUTPATIENT)
Dept: HOME HEALTH SERVICES | Facility: HOME HEALTH | Age: 89
End: 2024-09-05
Payer: MEDICARE

## 2024-09-05 NOTE — HH CARE COORDINATION
Home Care received a Referral for Physical Therapy. We have processed the referral for a Start of Care on 09/07-09/08.     If you have any questions or concerns, please feel free to contact us at [phone]. Follow the prompts, enter your five digit zip code, and you will be directed to your care team on [facility].

## 2024-09-05 NOTE — RESULT ENCOUNTER NOTE
Patient's labs all remained stable except for slightly lowered sodium tell patient to add salt to food or drink a Gatorade once a day

## 2024-09-09 ENCOUNTER — TELEPHONE (OUTPATIENT)
Dept: PRIMARY CARE | Facility: CLINIC | Age: 89
End: 2024-09-09
Payer: MEDICARE

## 2024-09-09 NOTE — TELEPHONE ENCOUNTER
PT DAUGHTER HYUN CARVER [phone] RETURNING OFFICE CALL REGARDING MESSAGE SHE RECEIVED ABOUT LAB WORK

## 2024-09-10 ENCOUNTER — HOME CARE VISIT (OUTPATIENT)
Dept: HOME HEALTH SERVICES | Facility: HOME HEALTH | Age: 89
End: 2024-09-10
Payer: MEDICARE

## 2024-09-10 VITALS
RESPIRATION RATE: 18 BRPM | OXYGEN SATURATION: 98 % | DIASTOLIC BLOOD PRESSURE: 70 MMHG | HEART RATE: 72 BPM | SYSTOLIC BLOOD PRESSURE: 135 MMHG | TEMPERATURE: 97.6 F

## 2024-09-10 PROCEDURE — 169592 NO-PAY CLAIM PROCEDURE

## 2024-09-10 PROCEDURE — G0151 HHCP-SERV OF PT,EA 15 MIN: HCPCS | Mod: HHH

## 2024-09-10 SDOH — HEALTH STABILITY: PHYSICAL HEALTH: EXERCISE COMMENTS: LY IN ORDER TO IMPROVE STRENGTH, CONTROL, AND FUNCTIONAL PERFORMANCE.

## 2024-09-10 SDOH — HEALTH STABILITY: PHYSICAL HEALTH
EXERCISE COMMENTS: PT INSTRUCTED PATIENT IN SEATED EXERCISES IN ORDER TO IMPROVE LE STRENGTH AND FUNCTIONAL PERFORMANCE, 1X10:  - KNEE EXTENSION  - KNEE FLEXION  - HIP FLEXION  - HIP ABDUCTION/ADDUCTION  - PF/DF    PT INSTRUCTED PATIENT IN PERFORMING EXERCISES 1-2X DAI

## 2024-09-10 ASSESSMENT — ENCOUNTER SYMPTOMS
PERSON REPORTING PAIN: PATIENT
DENIES PAIN: 1
OCCASIONAL FEELINGS OF UNSTEADINESS: 1

## 2024-09-10 ASSESSMENT — ACTIVITIES OF DAILY LIVING (ADL)
OASIS_M1830: 03
AMBULATION_DISTANCE/DURATION_TOLERATED: 50 FT.
ENTERING_EXITING_HOME: MODERATE ASSIST
AMBULATION ASSISTANCE ON FLAT SURFACES: 1

## 2024-09-10 NOTE — TELEPHONE ENCOUNTER
Allison Morrison, APRN-CNP  9/5/2024 11:38 AM EDT       Patient's labs all remained stable except for slightly lowered sodium tell patient to add salt to food or drink a Gatorade once a day

## 2024-09-11 ENCOUNTER — HOME CARE VISIT (OUTPATIENT)
Dept: HOME HEALTH SERVICES | Facility: HOME HEALTH | Age: 89
End: 2024-09-11
Payer: MEDICARE

## 2024-09-13 ENCOUNTER — HOME CARE VISIT (OUTPATIENT)
Dept: HOME HEALTH SERVICES | Facility: HOME HEALTH | Age: 89
End: 2024-09-13
Payer: MEDICARE

## 2024-09-13 VITALS
SYSTOLIC BLOOD PRESSURE: 129 MMHG | HEART RATE: 80 BPM | OXYGEN SATURATION: 99 % | TEMPERATURE: 97.9 F | RESPIRATION RATE: 22 BRPM | DIASTOLIC BLOOD PRESSURE: 70 MMHG

## 2024-09-13 PROCEDURE — G0151 HHCP-SERV OF PT,EA 15 MIN: HCPCS | Mod: HHH

## 2024-09-13 ASSESSMENT — ENCOUNTER SYMPTOMS: DENIES PAIN: 1

## 2024-09-18 ENCOUNTER — HOME CARE VISIT (OUTPATIENT)
Dept: HOME HEALTH SERVICES | Facility: HOME HEALTH | Age: 89
End: 2024-09-18
Payer: MEDICARE

## 2024-09-18 VITALS
TEMPERATURE: 97.6 F | DIASTOLIC BLOOD PRESSURE: 83 MMHG | OXYGEN SATURATION: 98 % | HEART RATE: 74 BPM | SYSTOLIC BLOOD PRESSURE: 138 MMHG

## 2024-09-18 PROCEDURE — G0151 HHCP-SERV OF PT,EA 15 MIN: HCPCS | Mod: HHH

## 2024-09-18 ASSESSMENT — ENCOUNTER SYMPTOMS: DENIES PAIN: 1

## 2024-09-19 ENCOUNTER — HOME CARE VISIT (OUTPATIENT)
Dept: HOME HEALTH SERVICES | Facility: HOME HEALTH | Age: 89
End: 2024-09-19
Payer: MEDICARE

## 2024-09-19 VITALS
HEART RATE: 75 BPM | OXYGEN SATURATION: 98 % | DIASTOLIC BLOOD PRESSURE: 63 MMHG | SYSTOLIC BLOOD PRESSURE: 146 MMHG | TEMPERATURE: 97.6 F

## 2024-09-19 PROCEDURE — G0151 HHCP-SERV OF PT,EA 15 MIN: HCPCS | Mod: HHH

## 2024-09-20 ENCOUNTER — APPOINTMENT (OUTPATIENT)
Dept: HOME HEALTH SERVICES | Facility: HOME HEALTH | Age: 89
End: 2024-09-20
Payer: MEDICARE

## 2024-09-22 ENCOUNTER — HOME CARE VISIT (OUTPATIENT)
Dept: HOME HEALTH SERVICES | Facility: HOME HEALTH | Age: 89
End: 2024-09-22
Payer: MEDICARE

## 2024-09-24 ENCOUNTER — TELEPHONE (OUTPATIENT)
Dept: PRIMARY CARE | Facility: CLINIC | Age: 89
End: 2024-09-24
Payer: MEDICARE

## 2024-09-24 ENCOUNTER — HOME CARE VISIT (OUTPATIENT)
Dept: HOME HEALTH SERVICES | Facility: HOME HEALTH | Age: 89
End: 2024-09-24
Payer: MEDICARE

## 2024-09-24 VITALS
DIASTOLIC BLOOD PRESSURE: 78 MMHG | OXYGEN SATURATION: 97 % | TEMPERATURE: 97.6 F | RESPIRATION RATE: 20 BRPM | SYSTOLIC BLOOD PRESSURE: 150 MMHG | HEART RATE: 78 BPM

## 2024-09-24 DIAGNOSIS — R41.0 DELIRIUM: Primary | ICD-10-CM

## 2024-09-24 PROCEDURE — G0151 HHCP-SERV OF PT,EA 15 MIN: HCPCS | Mod: HHH

## 2024-09-24 SDOH — HEALTH STABILITY: PHYSICAL HEALTH
EXERCISE COMMENTS: E FLEXION WITHOUT EXT.     PATIENT PERFROMED THE FOLLOWING EXERCISES IN SITTING:  - KNEE FLEXION, B, 1X 10  - LAQ, B, 1 X 10  - ANKLE DF, B, 3S HOLD, 1 X 10  - B SHOULDER ER WITH YELLOW TB, 1 X 10  - SHOULDERS RETRACTION WITH YELLOW TB, 1 X 10

## 2024-09-24 SDOH — HEALTH STABILITY: PHYSICAL HEALTH
EXERCISE COMMENTS: PATIENT HAS BEEN PERFORMING SLR, ANKLE PUMPS, AND HIP ABD BUT HAS FORGOTTEN TO DO OTHERS. SHE HAS BEEN WALKING WITH HER FWW EVERY DAY. PATIENT WAS MORE CHALLENGED WITH L>R KNEE FLEXION AND NEEDED TO TAKE OFF HER SHOE. SHE REQUIRED CUES TO PERFORM KNE

## 2024-09-24 ASSESSMENT — ENCOUNTER SYMPTOMS
PERSON REPORTING PAIN: PATIENT
DENIES PAIN: 1

## 2024-09-24 ASSESSMENT — ACTIVITIES OF DAILY LIVING (ADL): AMBULATION ASSISTANCE ON FLAT SURFACES: 1

## 2024-09-24 NOTE — TELEPHONE ENCOUNTER
PT DAUGHTER HYUN CARVER -[phone] CALLING STATING SHE DOING PHYSICAL THERAPY AND THE THERAPIST ADVISED SHE MAY HAVE A UTI BECAUSE OF HOW HOW SHE HAS BEEN REACTING  SHE IS EXHAUSED TIRED AND NO USUAL SYMPTOMS OF UTI NOT ENGAGING STATES SHE IS NOT ENGAGING WITH PEOPLE PT WILL NEED VIRTUAL OFFERED 10/3 DAUGHTER NOT SURE WHAT IS GOING ON WOULD LIKE TO TALK TO SOMEONE ABOUT THIS

## 2024-09-25 ENCOUNTER — TELEPHONE (OUTPATIENT)
Dept: PRIMARY CARE | Facility: CLINIC | Age: 89
End: 2024-09-25
Payer: MEDICARE

## 2024-09-25 ENCOUNTER — LAB (OUTPATIENT)
Dept: LAB | Facility: LAB | Age: 89
End: 2024-09-25
Payer: MEDICARE

## 2024-09-25 DIAGNOSIS — R41.0 DELIRIUM: ICD-10-CM

## 2024-09-25 PROCEDURE — 87086 URINE CULTURE/COLONY COUNT: CPT

## 2024-09-25 PROCEDURE — 87186 SC STD MICRODIL/AGAR DIL: CPT

## 2024-09-25 NOTE — TELEPHONE ENCOUNTER
PT DAUGHTER WILL COME TO  URINE CUP THIS MORNING PATIENT UNABLE TO GO IN TO LAB FERNANDO PLACED THE URINE CULTURE ORDER

## 2024-09-26 ENCOUNTER — HOME CARE VISIT (OUTPATIENT)
Dept: HOME HEALTH SERVICES | Facility: HOME HEALTH | Age: 89
End: 2024-09-26
Payer: MEDICARE

## 2024-09-26 VITALS
HEART RATE: 69 BPM | OXYGEN SATURATION: 97 % | SYSTOLIC BLOOD PRESSURE: 136 MMHG | RESPIRATION RATE: 24 BRPM | TEMPERATURE: 97.5 F | DIASTOLIC BLOOD PRESSURE: 87 MMHG

## 2024-09-26 PROCEDURE — G0151 HHCP-SERV OF PT,EA 15 MIN: HCPCS | Mod: HHH

## 2024-09-26 SDOH — HEALTH STABILITY: PHYSICAL HEALTH: EXERCISE COMMENTS: PATIENT PERFORMED THE FOLLOWING THEREX DURING PT VISIT:  - R HIP FLEXION, 4" STEP, 2 X 10

## 2024-09-26 SDOH — HEALTH STABILITY: PHYSICAL HEALTH
EXERCISE COMMENTS: PATIENT WALKED A FEW TIMES TO HER DINING ROOM AND HER LIVING ROOM TO EAT MEALS. HAD A FRESH FRUIT PRIOR TO PT ARRIVAL. COMPLIANT WITH HEP.     PATIENT DID NOT PERFORM HEP TODAY AS ADVISED BY PT LAST VISIT. PATIENT REQUIRED REST BREAKS BETWEEN SETS.

## 2024-09-26 ASSESSMENT — ENCOUNTER SYMPTOMS
PERSON REPORTING PAIN: PATIENT
DENIES PAIN: 1

## 2024-09-27 LAB — BACTERIA UR CULT: ABNORMAL

## 2024-10-01 ENCOUNTER — HOME CARE VISIT (OUTPATIENT)
Dept: HOME HEALTH SERVICES | Facility: HOME HEALTH | Age: 89
End: 2024-10-01
Payer: MEDICARE

## 2024-10-01 VITALS
TEMPERATURE: 98 F | HEART RATE: 85 BPM | DIASTOLIC BLOOD PRESSURE: 88 MMHG | OXYGEN SATURATION: 95 % | SYSTOLIC BLOOD PRESSURE: 138 MMHG | RESPIRATION RATE: 22 BRPM

## 2024-10-01 PROCEDURE — G0151 HHCP-SERV OF PT,EA 15 MIN: HCPCS | Mod: HHH

## 2024-10-01 SDOH — HEALTH STABILITY: PHYSICAL HEALTH: EXERCISE COMMENTS: NKLE PF/DF, 1 X 5 AT COUNTERTOP WITH SBA  - KNEE FLEX, B, 1 X 5 AT COUNTERTOP WITH SBA

## 2024-10-01 SDOH — HEALTH STABILITY: PHYSICAL HEALTH
EXERCISE COMMENTS: PT IS COMPLIANT WITH HEP. PT REPORTED SHE WALKED IN HER HOME 3X TODAY AND PERFORMED 6 EXERCISES PRIOR TO PT.  PT EDUCATED PATIENT IN UPDATED HEP.  PT PERFORMED THE FOLLOWING STANDING THEREX:  - STEPPING, 4" STEP, R AND L, 1 X 10 WITH FWW AND SBA  - A

## 2024-10-01 ASSESSMENT — ACTIVITIES OF DAILY LIVING (ADL): AMBULATION ASSISTANCE ON FLAT SURFACES: 1

## 2024-10-01 ASSESSMENT — ENCOUNTER SYMPTOMS
PERSON REPORTING PAIN: PATIENT
DENIES PAIN: 1

## 2024-10-03 ENCOUNTER — HOME CARE VISIT (OUTPATIENT)
Dept: HOME HEALTH SERVICES | Facility: HOME HEALTH | Age: 89
End: 2024-10-03
Payer: MEDICARE

## 2024-10-03 ENCOUNTER — APPOINTMENT (OUTPATIENT)
Dept: HOME HEALTH SERVICES | Facility: HOME HEALTH | Age: 89
End: 2024-10-03
Payer: MEDICARE

## 2024-10-03 ASSESSMENT — ENCOUNTER SYMPTOMS: DENIES PAIN: 1

## 2024-10-04 ASSESSMENT — ACTIVITIES OF DAILY LIVING (ADL)
HOME_HEALTH_OASIS: 01
OASIS_M1830: 02

## 2025-03-03 DIAGNOSIS — E78.2 MIXED HYPERLIPIDEMIA: ICD-10-CM

## 2025-03-03 RX ORDER — PRAVASTATIN SODIUM 20 MG/1
20 TABLET ORAL DAILY
Qty: 90 TABLET | Refills: 0 | Status: SHIPPED | OUTPATIENT
Start: 2025-03-03

## 2025-03-21 ENCOUNTER — OFFICE VISIT (OUTPATIENT)
Dept: URGENT CARE | Age: OVER 89
End: 2025-03-21
Payer: MEDICARE

## 2025-03-21 VITALS
RESPIRATION RATE: 18 BRPM | BODY MASS INDEX: 22.98 KG/M2 | HEART RATE: 95 BPM | OXYGEN SATURATION: 99 % | WEIGHT: 114 LBS | DIASTOLIC BLOOD PRESSURE: 75 MMHG | HEIGHT: 59 IN | SYSTOLIC BLOOD PRESSURE: 135 MMHG | TEMPERATURE: 98.2 F

## 2025-03-21 DIAGNOSIS — K08.89 PAIN, DENTAL: Primary | ICD-10-CM

## 2025-03-21 NOTE — PATIENT INSTRUCTIONS
Monitor right lower jaw-if pain returns or you notice redness around the lower jaw please return to urgent care.  Continue ibuprofen/Tylenol for pain.  If you develop fevers, chills, body aches, rigors please go to local emergency department for further evaluation peer    Please follow up with your primary provider within one week if symptoms do not improve.  You may schedule an appointment online at [email or web address] or call [phone]. Go to the Emergency Department if symptoms significantly worsen or if you develop chest pain or shortness of breath.

## 2025-03-21 NOTE — PROGRESS NOTES
"Subjective   Patient ID: Sofía Bass is a 89 y.o. female. They present today with a chief complaint of Dental Pain.    History of Present Illness  Sofía Bass is a 89 y.o. female who presents to the clinic for right lower jaw pain that started yesterday.  Patient is accompanied by patient's daughters.  Patient's daughter called his dentist.  Dentist office made appointment for Monday.  Advised patient to go to urgent care to look for any abscess or possible antibiotic therapy.  While in clinic patient denies any pain to the lower jaw.  Patient states she took Advil this morning. Pt denies any chest pain, sob, N/V at this time in clinic.             Past Medical History  Allergies as of 03/21/2025 - Reviewed 03/21/2025   Allergen Reaction Noted    Alendronate GI Upset 10/04/2023    Penicillin Unknown 10/04/2023    Rosuvastatin Unknown 10/04/2023    Sulfa (sulfonamide antibiotics) Unknown 10/04/2023       (Not in a hospital admission)       No past medical history on file.    No past surgical history on file.     reports that she has never smoked. She has never been exposed to tobacco smoke. She has never used smokeless tobacco. She reports that she does not drink alcohol and does not use drugs.    Review of Systems  Review of Systems   HENT:  Positive for dental problem.    All other systems reviewed and are negative.                                 Objective    Vitals:    03/21/25 1103   BP: 135/75   Pulse: 95   Resp: 18   Temp: 36.8 °C (98.2 °F)   TempSrc: Oral   SpO2: 99%   Weight: 51.7 kg (114 lb)   Height: 1.499 m (4' 11\")     No LMP recorded (lmp unknown). Patient is postmenopausal.    Physical Exam  Constitutional:       Appearance: Normal appearance.   HENT:      Mouth/Throat:      Mouth: Mucous membranes are moist.      Dentition: Abnormal dentition. No dental tenderness, gingival swelling or dental abscesses.      Comments: No erythema or tenderness noted to the right lower jawline.  When palpating " teeth no discomfort noted.  Neurological:      General: No focal deficit present.      Mental Status: She is alert and oriented to person, place, and time. Mental status is at baseline.   Psychiatric:         Mood and Affect: Mood normal.         Behavior: Behavior normal.         Procedures    Point of Care Test & Imaging Results from this visit  No results found for this visit on 03/21/25.   No results found.    Diagnostic study results (if any) were reviewed by DIOMEDES Aj.    Assessment/Plan   Allergies, medications, history, and pertinent labs/EKGs/Imaging reviewed by DIOMEDES Aj.     Medical Decision Making  Advised patient and patient daughter is to monitor right lower jaw for the next 72 hours.  Advised patient patient daughters to monitor for any signs of erythema, edema, tenderness or discharge. If this occurs to return to  or the ER.  Advised patient and patient daughters to continue ibuprofen/Tylenol.  Advised patient and patient daughters to follow-up with dentist on Monday.    Orders and Diagnoses  There are no diagnoses linked to this encounter.    Medical Admin Record      Patient disposition: home    Electronically signed by DIOMEDES Aj  12:03 PM

## 2025-06-05 DIAGNOSIS — E78.2 MIXED HYPERLIPIDEMIA: ICD-10-CM

## 2025-06-05 RX ORDER — PRAVASTATIN SODIUM 20 MG/1
20 TABLET ORAL DAILY
Qty: 90 TABLET | Refills: 0 | Status: SHIPPED | OUTPATIENT
Start: 2025-06-05

## 2025-06-05 NOTE — TELEPHONE ENCOUNTER
Prescription request received and populated   Pharmacy populated  Last Office Visit: 9/04/24 for 6 month follow up  Last physical 11/29/23    Call placed to patient to schedule and spoke with patients daughter Makenna who scheduled patient for physical 6/18/25

## 2025-06-13 ASSESSMENT — PROMIS GLOBAL HEALTH SCALE
RATE_PHYSICAL_HEALTH: GOOD
RATE_MENTAL_HEALTH: GOOD
CARRYOUT_SOCIAL_ACTIVITIES: FAIR
RATE_AVERAGE_FATIGUE: MILD
RATE_AVERAGE_PAIN: 0
EMOTIONAL_PROBLEMS: NEVER
RATE_GENERAL_HEALTH: GOOD
CARRYOUT_PHYSICAL_ACTIVITIES: A LITTLE
RATE_SOCIAL_SATISFACTION: GOOD
RATE_QUALITY_OF_LIFE: GOOD

## 2025-06-18 ENCOUNTER — OFFICE VISIT (OUTPATIENT)
Dept: PRIMARY CARE | Facility: CLINIC | Age: OVER 89
End: 2025-06-18
Payer: MEDICARE

## 2025-06-18 VITALS
SYSTOLIC BLOOD PRESSURE: 138 MMHG | HEART RATE: 73 BPM | WEIGHT: 113 LBS | HEIGHT: 59 IN | TEMPERATURE: 99.1 F | BODY MASS INDEX: 22.78 KG/M2 | RESPIRATION RATE: 18 BRPM | OXYGEN SATURATION: 95 % | DIASTOLIC BLOOD PRESSURE: 74 MMHG

## 2025-06-18 DIAGNOSIS — E78.2 MIXED HYPERLIPIDEMIA: ICD-10-CM

## 2025-06-18 DIAGNOSIS — Z00.00 ANNUAL WELLNESS VISIT: Primary | ICD-10-CM

## 2025-06-18 PROCEDURE — 99215 OFFICE O/P EST HI 40 MIN: CPT | Performed by: NURSE PRACTITIONER

## 2025-06-18 RX ORDER — PRAVASTATIN SODIUM 20 MG/1
20 TABLET ORAL DAILY
Qty: 90 TABLET | Refills: 1 | Status: SHIPPED | OUTPATIENT
Start: 2025-06-18

## 2025-06-18 ASSESSMENT — PATIENT HEALTH QUESTIONNAIRE - PHQ9
SUM OF ALL RESPONSES TO PHQ9 QUESTIONS 1 AND 2: 0
1. LITTLE INTEREST OR PLEASURE IN DOING THINGS: NOT AT ALL
2. FEELING DOWN, DEPRESSED OR HOPELESS: NOT AT ALL

## 2025-06-18 ASSESSMENT — PAIN SCALES - GENERAL: PAINLEVEL_OUTOF10: 0-NO PAIN

## 2025-06-18 NOTE — PROGRESS NOTES
History Of Present Illness  Sofía Bass is a 89 y.o. female presenting for a wellness exam.    Preventative screenings    Is here with her 2 daughters for a physical and lab work no concerns today    Patient tolerating pravastatin well lab work ordered, still having some issues with constipation advised MiraLAX powder    Patient Care Team:  DIOMEDES Suarez as PCP - General (Family Medicine)  DIOMEDES Suarez as Primary Care Provider     General health: Good  Exercise: Balanced  Caffeine: tea once a day   Diet: Balanced    Functional ability:   No cognitive impairment observed.    No cognitive impairment reported by patient or family.    ADL screening:  Hearing without difficulties.  Independent in bathing, dressing, walking in home    IADL screening:  Independent in managing finances, grocery shopping, taking medications, doing housework    Home Safety:   Falls Home safety risk factors: No loose rugs, poor lighting, stairs without rails, bathroom with no grab bars    Depression screening:  Patient Health Questionnaire-2 Score: 0     CARDIAC SCREENING   The ASCVD Risk score (Maddi ARREOLA, et al., 2019) failed to calculate for the following reasons:    The 2019 ASCVD risk score is only valid for ages 40 to 79       Past Medical History  Patient Active Problem List    Diagnosis Date Noted    Asthenia 10/04/2023    Constipation by delayed colonic transit 10/04/2023    Hyperlipidemia 10/04/2023    Hyponatremia 10/04/2023    Midline cystocele 10/04/2023    Prolapse of female genital organs 10/04/2023    Vitamin D deficiency 10/04/2023        Medications  Medications and current supplements were reviewed and recorded.   Does the patient use opiate medications:  NO . If yes, do they take medication appropriately: NA.  Medications ordered prior to the current encounter[1]     Surgical History  She has a past surgical history that includes Eye surgery (2015).     Social History  She reports that she has  "never smoked. She has never been exposed to tobacco smoke. She has never used smokeless tobacco. She reports that she does not drink alcohol and does not use drugs.    Family History  Family History[2]     Allergies  Alendronate, Penicillin, Rosuvastatin, and Sulfa (sulfonamide antibiotics)    Immunizations  Immunization History   Administered Date(s) Administered    COVID-19, mRNA, LNP-S, PF, 30 mcg/0.3 mL dose 10/28/2021    Flu vaccine, quadrivalent, high-dose, preservative free, age 65y+ (FLUZONE) 10/13/2021, 11/28/2022, 10/05/2023    Flu vaccine, trivalent, preservative free, HIGH-DOSE, age 65y+ (Fluzone) 10/11/2017, 10/25/2018, 09/16/2020, 09/04/2024    Influenza, injectable, quadrivalent 11/12/2018, 10/18/2019    Influenza, seasonal, injectable 01/28/2010, 10/16/2013, 10/01/2014, 10/20/2015, 10/05/2016    Influenza, trivalent, adjuvanted 10/05/2017    Moderna COVID-19 vaccine, 12 years and older (50mcg/0.5mL)(Spikevax) 10/23/2024    Pfizer COVID-19 vaccine, 12 years and older, (30mcg/0.3mL) (Comirnaty) 11/10/2023    Pfizer COVID-19 vaccine, bivalent, age 12 years and older (30 mcg/0.3 mL) 10/13/2022    Pfizer Gray Cap SARS-CoV-2 06/22/2022    Pfizer Purple Cap SARS-CoV-2 01/21/2021, 02/11/2021    Pneumococcal conjugate vaccine, 13-valent (PREVNAR 13) 12/21/2016    Pneumococcal polysaccharide vaccine, 23-valent, age 2 years and older (PNEUMOVAX 23) 01/28/2005    Td vaccine, age 7 years and older (TDVAX) 12/18/2008        ROS  Negative, except as discussed in HPI     Vitals  /74 (BP Location: Left arm, Patient Position: Sitting, BP Cuff Size: Small adult)   Pulse 73   Temp 37.3 °C (99.1 °F)   Resp 18   Ht 1.499 m (4' 11\")   Wt 51.3 kg (113 lb)   LMP  (LMP Unknown)   SpO2 95%   BMI 22.82 kg/m²      Physical Exam  Vitals and nursing note reviewed.   Constitutional:       Appearance: Normal appearance.   HENT:      Head: Normocephalic and atraumatic.      Right Ear: Tympanic membrane, ear canal and " external ear normal.      Left Ear: Tympanic membrane, ear canal and external ear normal.      Nose: Nose normal.      Mouth/Throat:      Mouth: Mucous membranes are moist.      Pharynx: Oropharynx is clear.   Eyes:      Extraocular Movements: Extraocular movements intact.      Conjunctiva/sclera: Conjunctivae normal.      Pupils: Pupils are equal, round, and reactive to light.   Neck:      Thyroid: No thyromegaly.   Cardiovascular:      Rate and Rhythm: Normal rate and regular rhythm.      Pulses: Normal pulses.      Heart sounds: Normal heart sounds, S1 normal and S2 normal.   Pulmonary:      Effort: Pulmonary effort is normal.      Breath sounds: Normal breath sounds. No wheezing or rhonchi.   Abdominal:      General: Bowel sounds are normal.      Palpations: Abdomen is soft. There is no mass.      Tenderness: There is no abdominal tenderness. There is no guarding.   Genitourinary:     Comments: Not examined  Musculoskeletal:         General: Normal range of motion.      Cervical back: Normal range of motion.      Right lower leg: No edema.      Left lower leg: No edema.   Lymphadenopathy:      Cervical: No cervical adenopathy.   Skin:     General: Skin is warm and dry.      Capillary Refill: Capillary refill takes less than 2 seconds.      Findings: No rash.   Neurological:      General: No focal deficit present.      Mental Status: She is alert and oriented to person, place, and time. Mental status is at baseline.      Cranial Nerves: Cranial nerves 2-12 are intact. No cranial nerve deficit.      Sensory: Sensation is intact.      Motor: Motor function is intact.      Coordination: Coordination is intact.      Gait: Gait is intact.   Psychiatric:         Mood and Affect: Mood normal.         Behavior: Behavior normal.         Thought Content: Thought content normal.         Judgment: Judgment normal.         Relevant Results  Lab Results   Component Value Date    WBC 8.1 08/08/2023    WBC 7.3 08/01/2023    HGB  "12.8 08/08/2023    HGB 12.6 08/01/2023    HCT 35.7 (L) 08/08/2023    HCT 34.3 (L) 08/01/2023    MCV 92 08/08/2023    MCV 91 08/01/2023     08/08/2023     08/01/2023     Lab Results   Component Value Date     06/18/2025     (L) 09/04/2024    K 4.5 06/18/2025    K 4.6 09/04/2024     06/18/2025    CL 94 (L) 09/04/2024    CO2 24 06/18/2025    CO2 24 09/04/2024    BUN 21 06/18/2025    BUN 12 09/04/2024    CREATININE 0.93 06/18/2025    CREATININE 0.80 09/04/2024    CALCIUM 9.9 06/18/2025    CALCIUM 10.3 09/04/2024    PROT 7.3 06/18/2025    PROT 7.1 09/04/2024    BILITOT 0.4 06/18/2025    BILITOT 0.3 09/04/2024    ALKPHOS 106 06/18/2025    ALKPHOS 123 09/04/2024    ALT 8 06/18/2025    ALT 8 09/04/2024    AST 15 06/18/2025    AST 16 09/04/2024    GLUCOSE 97 06/18/2025    GLUCOSE 105 (H) 09/04/2024     No results found for: \"HGBA1C\"  No results found for: \"TSH\", \"FREET4\"   Lab Results   Component Value Date    CHOL 210 (H) 06/18/2025    TRIG 79 06/18/2025    HDL 73 06/18/2025           Assessment/Plan   Sofía was seen today for annual exam.  Diagnoses and all orders for this visit:  Annual wellness visit (Primary)  Mixed hyperlipidemia  -     pravastatin (Pravachol) 20 mg tablet; Take 1 tablet (20 mg) by mouth once daily.  -     Comprehensive Metabolic Panel; Future  -     Lipid Panel; Future  -     Comprehensive Metabolic Panel  -     Lipid Panel       Medications Discontinued During This Encounter   Medication Reason    cholecalciferol (Vitamin D-3) 10 MCG (400 UNIT) tablet Med List Cleanup    pravastatin (Pravachol) 20 mg tablet Reorder        Counseling:   Medication education:   -Education:  The patient is counseled regarding potential side-effects of any and all new medications  -Understanding:  Patient expressed understanding of information discussed today  -Adherence:  No barriers to adherence identified    Advanced care planning: Discussed including healthcare power of  as " well as specific end-of-life choices and/or directives was discussed with the patient , voluntarily, and advance care decision was documented in the patient's record.     Final treatment plan is a result of shared decision making with patient.         Allison Morrison, TOMI-CNP         Tobacco/Alcohol/Opioid use, as well as Illicit Drug Use was screened for/reviewed and documented in Social Documentation section of the chart and medication list as appropriate    Depression Screening  Depression screening completed using the PHQ-2 questions with results documented in the chart/encounter (15m).  (See Rooming Screening section for documentation, or note for additional information)    Cardiac Risk Assessment  Cardiovascular risk was discussed and, if needed, lifestyle modifications recommended, including nutritional choices, exercise, and elimination of habits contributing to risk.   We agreed on a plan to reduce the current cardiovascular risk based on above discussion as needed.     Aspirin use/disuse was discussed and documented in the Problem List of the medical record (under Cardiac Risk Counseling) after reviewing the updated guidelines below:  Consider low dose Aspirin ( mg) use if the benefit for cardiovascular disease prevention outweighs risk for bleeding complications.   In general, low dose ASA should be considered:  In patients WITHOUT prior MI/stroke/PAD (primary prevention):   a. Age <60: Use if 10-year cardiovascular disease risk >20%, with discussion of risks and benefits with patient  b. Age 60-<70: Use if 10-year cardiovascular disease risk >20% and low bleeding (e.g., gastrointestinal) risk, with discussion of risks and benefits with patient  c. Age >=70: Do not use    In patients WITH prior MI/stroke/PAD (secondary prevention):   Generally use unless extremely high bleeding (e.g., gastrointenstinal) risk, with discussion of risks and benefits with patient    Advance Directives  Discussion  Advanced Care Planning (including a Living Will, Healthcare POA, as well as specific end of life choices and/or directives), was discussed with the patient and/or surrogate, voluntarily, and details of that discussion documented in the Problem List (under Advanced Directives Discussion) of the medical record.    (~16 min spent discussing above)     During the course of the visit the patient was educated and counseled about age appropriate screening and preventive services. Completed preventive screenings were documented in the chart and orders were placed for outstanding screenings/procedures as documented in the Assessment and Plan.    Patient Instructions (the written plan) was given to the patient at check out.       [1]   Current Outpatient Medications   Medication Sig Dispense Refill    cholecalciferol (Vitamin D3) 25 MCG (1000 UT) tablet Take 1 tablet (1,000 Units) by mouth once daily.      pravastatin (Pravachol) 20 mg tablet Take 1 tablet (20 mg) by mouth once daily. 90 tablet 1     No current facility-administered medications for this visit.   [2]   Family History  Problem Relation Name Age of Onset    Cancer Mother      Kidney failure Mother      Accidental death Father Abelardo Coronel     Breast cancer Sister Peyton Lambert     Cancer Sister Peyton Lambert     Colon cancer Brother Abelardo Homer     Cancer Brother Abelardo Coronel     Cancer Other Grandson     Stomach cancer Other Grandson     Breast cancer Daughter Mirian Bass     Cancer Daughter Mirian Bass     Cancer Brother Raphael Coronel     Cancer Daughter Zandra Campolieti     Diabetes Brother Marek Coronel

## 2025-06-19 LAB
ALBUMIN SERPL-MCNC: 4.4 G/DL (ref 3.6–5.1)
ALP SERPL-CCNC: 106 U/L (ref 37–153)
ALT SERPL-CCNC: 8 U/L (ref 6–29)
ANION GAP SERPL CALCULATED.4IONS-SCNC: 11 MMOL/L (CALC) (ref 7–17)
AST SERPL-CCNC: 15 U/L (ref 10–35)
BILIRUB SERPL-MCNC: 0.4 MG/DL (ref 0.2–1.2)
BUN SERPL-MCNC: 21 MG/DL (ref 7–25)
CALCIUM SERPL-MCNC: 9.9 MG/DL (ref 8.6–10.4)
CHLORIDE SERPL-SCNC: 101 MMOL/L (ref 98–110)
CHOLEST SERPL-MCNC: 210 MG/DL
CHOLEST/HDLC SERPL: 2.9 (CALC)
CO2 SERPL-SCNC: 24 MMOL/L (ref 20–32)
CREAT SERPL-MCNC: 0.93 MG/DL (ref 0.6–0.95)
EGFRCR SERPLBLD CKD-EPI 2021: 57 ML/MIN/1.73M2
GLUCOSE SERPL-MCNC: 97 MG/DL (ref 65–139)
HDLC SERPL-MCNC: 73 MG/DL
LDLC SERPL CALC-MCNC: 119 MG/DL (CALC)
NONHDLC SERPL-MCNC: 137 MG/DL (CALC)
POTASSIUM SERPL-SCNC: 4.5 MMOL/L (ref 3.5–5.3)
PROT SERPL-MCNC: 7.3 G/DL (ref 6.1–8.1)
SODIUM SERPL-SCNC: 136 MMOL/L (ref 135–146)
TRIGL SERPL-MCNC: 79 MG/DL

## 2025-06-26 ENCOUNTER — TELEPHONE (OUTPATIENT)
Dept: PRIMARY CARE | Facility: CLINIC | Age: OVER 89
End: 2025-06-26
Payer: MEDICARE

## 2025-06-26 NOTE — TELEPHONE ENCOUNTER
Spoke with pt daughter regarding FMLA ppw, will call back to let office know how she would like to obtain ppw.

## 2025-06-30 ENCOUNTER — TELEPHONE (OUTPATIENT)
Dept: PRIMARY CARE | Facility: CLINIC | Age: OVER 89
End: 2025-06-30
Payer: MEDICARE